# Patient Record
Sex: FEMALE | Race: BLACK OR AFRICAN AMERICAN | NOT HISPANIC OR LATINO | URBAN - METROPOLITAN AREA
[De-identification: names, ages, dates, MRNs, and addresses within clinical notes are randomized per-mention and may not be internally consistent; named-entity substitution may affect disease eponyms.]

---

## 2023-04-02 ENCOUNTER — HOSPITAL ENCOUNTER (EMERGENCY)
Facility: HOSPITAL | Age: 68
Discharge: LEFT BEFORE DISCHARGE | End: 2023-04-02
Attending: EMERGENCY MEDICINE
Payer: MEDICAID

## 2023-04-02 VITALS
RESPIRATION RATE: 18 BRPM | OXYGEN SATURATION: 99 % | DIASTOLIC BLOOD PRESSURE: 68 MMHG | BODY MASS INDEX: 28.77 KG/M2 | HEIGHT: 66 IN | SYSTOLIC BLOOD PRESSURE: 141 MMHG | WEIGHT: 179 LBS | TEMPERATURE: 98 F | HEART RATE: 82 BPM

## 2023-04-02 DIAGNOSIS — R55 SYNCOPE: ICD-10-CM

## 2023-04-02 LAB — POCT GLUCOSE: 116 MG/DL (ref 70–110)

## 2023-04-02 PROCEDURE — 93010 ELECTROCARDIOGRAM REPORT: CPT | Mod: ,,, | Performed by: INTERNAL MEDICINE

## 2023-04-02 PROCEDURE — 93010 EKG 12-LEAD: ICD-10-PCS | Mod: ,,, | Performed by: INTERNAL MEDICINE

## 2023-04-02 PROCEDURE — 99285 EMERGENCY DEPT VISIT HI MDM: CPT | Mod: ,,, | Performed by: EMERGENCY MEDICINE

## 2023-04-02 PROCEDURE — 82962 GLUCOSE BLOOD TEST: CPT

## 2023-04-02 PROCEDURE — 99284 EMERGENCY DEPT VISIT MOD MDM: CPT

## 2023-04-02 PROCEDURE — 99285 PR EMERGENCY DEPT VISIT,LEVEL V: ICD-10-PCS | Mod: ,,, | Performed by: EMERGENCY MEDICINE

## 2023-04-02 PROCEDURE — 93005 ELECTROCARDIOGRAM TRACING: CPT

## 2023-04-02 NOTE — ED NOTES
Pt relays she will not wait for any of her ordered test to be complete, pt refused Iv Placement, PA Notified

## 2023-04-02 NOTE — ED PROVIDER NOTES
"Encounter Date: 4/2/2023       History     Chief Complaint   Patient presents with    Fall     Pt states she had a fall this morning after having a syncopal episode witnessed by family. Pt hit her head and her left shoulder, recently had left shoulder replacement in February. Patient takes blood thinners daily.     Loss of Consciousness     11:20 AM  Patient is a 67-year-old female with a history of HTN, dm 2, CAD status post stent stents, on Plavix and aspirin, recent left shoulder replacement who presents to [facility] ED with loss of consciousness and head trauma.    Patient denies any recent illness.  She states that she felt "good" today.  She is from [place], [place] and drove in with her son to visit her brother who is admitted to this hospital.  She recalls walking into his room and putting her purse down.  States that she did not have any prodromal symptoms including dizziness, lightheadedness, chest pain, nor shortness of breath, and had a syncopal episode.  Her sister states that she passed out for a few seconds.  She landed on her left side and hit her left head.  When patient regained consciousness, she did not have amnesia.  Currently, patient feels back to normal.  Her left shoulder is sore but her range of motion since therapy is at her baseline.    She has a history of diabetes and did take her medications today.  She ate a sausage biscuit sandwich for breakfast.  She slept well last night.  No fever, chills, cough, abdominal pain, chest pain, shortness of breath, dysuria, or diarrhea.  No history of syncope.  She has night sweats but attributes this to being postmenopausal.      Review of patient's allergies indicates:  No Known Allergies  No past medical history on file.  No past surgical history on file.  No family history on file.     Review of Systems   Constitutional:  Negative for appetite change, chills, diaphoresis and fever.   HENT:  Negative for sore throat.    Respiratory:  Negative for " cough and shortness of breath.    Cardiovascular:  Negative for chest pain.   Gastrointestinal:  Negative for abdominal pain, constipation, diarrhea, nausea and vomiting.   Genitourinary:  Negative for dysuria, frequency and hematuria.   Musculoskeletal:  Positive for arthralgias. Negative for back pain.   Skin:  Negative for rash and wound.   Neurological:  Positive for syncope. Negative for dizziness, weakness, light-headedness and headaches.   Hematological:  Does not bruise/bleed easily.     Physical Exam     Initial Vitals [04/02/23 1055]   BP Pulse Resp Temp SpO2   (!) 141/68 82 18 98.3 °F (36.8 °C) 99 %      MAP       --         Physical Exam    Vitals reviewed.  Constitutional: She appears well-developed and well-nourished. She is not diaphoretic. She is cooperative.  Non-toxic appearance. She does not have a sickly appearance. She does not appear ill. No distress. Face mask in place.   HENT:   Head: Normocephalic and atraumatic.   Nose: Nose normal.   Mouth/Throat: No trismus in the jaw.   Eyes: Conjunctivae and EOM are normal. Pupils are equal, round, and reactive to light. Right eye exhibits no nystagmus. Left eye exhibits no nystagmus.   Neck:   Normal range of motion.  Cardiovascular:  Normal rate and regular rhythm.           Pulses:       Radial pulses are 2+ on the right side and 2+ on the left side.   Pulmonary/Chest: No accessory muscle usage. No tachypnea. No respiratory distress.   Abdominal: She exhibits no distension.   Musculoskeletal:      Left shoulder: Decreased range of motion. Decreased strength.      Cervical back: Normal range of motion. No rigidity. No spinous process tenderness or muscular tenderness. Normal range of motion.      Right lower leg: No edema.      Left lower leg: No edema.      Comments: Decreased range of motion and strength of left shoulder secondary to recent replacement.  At baseline per patient.  No unilateral leg swelling.  No calf tenderness.     Neurological: She  is alert.   Oriented x4.  Provides full history.  Clear speech.  No facial asymmetry.  Good finger .  Normal finger-to-nose.   Skin: Skin is warm and dry. No erythema. No pallor.       ED Course   Procedures  Labs Reviewed   POCT GLUCOSE - Abnormal; Notable for the following components:       Result Value    POCT Glucose 116 (*)     All other components within normal limits        ECG Results              EKG 12-lead (Final result)  Result time 04/02/23 11:19:43      Final result by Interface, [facility] (04/02/23 11:19:43)                   Narrative:    Test Reason : R55,    Vent. Rate : 082 BPM     Atrial Rate : 082 BPM     P-R Int : 162 ms          QRS Dur : 086 ms      QT Int : 398 ms       P-R-T Axes : 055 003 007 degrees     QTc Int : 464 ms    Normal sinus rhythm  Normal ECG  No previous ECGs available  Confirmed by ANAHI YANG, ENIO ([id number]) on 4/2/2023 11:19:29 AM    Referred By:             Confirmed By:ENIO CHRISTIAN MD                                  Imaging Results    None          Medications - No data to display    Medical Decision Making:   History:   I obtained history from: someone other than patient.       <> Summary of History: Most provided by patient.  Some by sister at bedside.  Old Medical Records: I decided to obtain old medical records.  Old Records Summarized: records from clinic visits and records from previous admission(s).  Initial Assessment:   Patient is a 67-year-old female with a history of HTN, dm 2, CAD status post stent stents, on Plavix and aspirin, recent left shoulder replacement who presents to [facility] ED with loss of consciousness and head trauma.  Differential Diagnosis:   Includes but is not limited to cardiac syncope, orthostasis, vasovagal, dehydration, anemia, electrolyte abnormalities, UTI, intracerebral abnormality such as hemorrhage/hematoma.  Independently Interpreted Test(s):   I have ordered and independently interpreted EKG Reading(s) - see summary  "below  Clinical Tests:   Lab Tests: Ordered  Radiological Study: Ordered  Medical Tests: Ordered and Reviewed  ED Management:  Will initiate workup, obtain CT head given head trauma and anticoagulant use, gently hydrate, and reassess.           ED Course as of 04/02/23 1451   Sun Apr 02, 2023   1113 BP(!): 141/68 [CL]   1113 Temp: 98.3 °F (36.8 °C) [CL]   1113 Pulse: 82 [CL]   1113 Resp: 18 [CL]   1113 SpO2: 99 % [CL]   1141 On my independent interpretation, EKG with NSR at 82 beats per minute.  Nonspecific T-wave inversions in lead 3.  Normal intervals.  No ST changes.  No STEMI.   [CL]   1204 Nurse notified me that patient did not want to stay for any of the recommended labs and imaging test.  I reassessed patient.  She states that she would like to leave because she just wants to.  We discussed care that she is refusing which includes labs, CT head, x-rays of chest and shoulder, and telemetry, why they were ordered, and all the risks and complications that can or will result from refusal which includes life-threatening arrhythmias, cardiac arrest, brain bleeds bhavik with her plavix and aspirin use, infection, sepsis, death, paralysis, loss of function of organs, and all that is outlined on the AMA form, and she still wishes to leave.  She states that she would just like to go visit her brother then go home in the next hour back to [place].  Patient is competent and able to make her medical decisions.  I asked her sister if she is comfortable with her sister making this decision, and she also said that her sister is her "own guardian".  They understand that they can return to this emergency department or any [facility] facility.  We discussed signs and symptoms and the need to return to the ED, and they voiced their understanding.  Patient left AMA in stable condition and independently ambulated out of the emergency department. [CL]      ED Course User Index  [CL] Adamaris Dumont PA-C                 Clinical " Impression:   Final diagnoses:  [R55] Syncope        ED Disposition Condition    AMA Stable              Encounter Start Encounter End   4/5/2023  9:30 AM 4/5/2023  9:45 AM     Providers    Víctor Todd MD  Family Medicine  [address] [place] [address]&&  [place] [address]        Adamaris Dumont PA-C  04/02/23 1456

## 2023-04-02 NOTE — ED TRIAGE NOTES
Pt to the ed from home with a CC of syncope with LOC x a few mins. Pt denies cp, dizziness, sob, fever, chills at this time.